# Patient Record
Sex: FEMALE | Race: WHITE | NOT HISPANIC OR LATINO | URBAN - METROPOLITAN AREA
[De-identification: names, ages, dates, MRNs, and addresses within clinical notes are randomized per-mention and may not be internally consistent; named-entity substitution may affect disease eponyms.]

---
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
## 2024-01-05 ENCOUNTER — TELEPHONE ENCOUNTER (OUTPATIENT)
Dept: URBAN - METROPOLITAN AREA CLINIC 70 | Facility: CLINIC | Age: 19
End: 2024-01-05

## 2024-01-17 ENCOUNTER — OFFICE VISIT (OUTPATIENT)
Dept: URBAN - METROPOLITAN AREA CLINIC 70 | Facility: CLINIC | Age: 19
End: 2024-01-17
Payer: COMMERCIAL

## 2024-01-17 VITALS
HEART RATE: 108 BPM | SYSTOLIC BLOOD PRESSURE: 135 MMHG | TEMPERATURE: 98.1 F | BODY MASS INDEX: 32.47 KG/M2 | WEIGHT: 219.2 LBS | DIASTOLIC BLOOD PRESSURE: 82 MMHG | HEIGHT: 69 IN

## 2024-01-17 DIAGNOSIS — K51.019 CHRONIC ULCERATIVE PANCOLITIS WITH COMPLICATION: ICD-10-CM

## 2024-01-17 PROCEDURE — 99214 OFFICE O/P EST MOD 30 MIN: CPT | Performed by: REGISTERED NURSE

## 2024-01-17 RX ORDER — USTEKINUMAB 90 MG/ML
90MG INJECTION, SOLUTION SUBCUTANEOUS
Status: ACTIVE | COMMUNITY
Start: 2023-07-18

## 2024-01-17 RX ORDER — CHOLESTYRAMINE 4 G/9G
1 SCOOP POWDER, FOR SUSPENSION ORAL
Qty: 240 GM | Refills: 5 | Status: DISCONTINUED | COMMUNITY
Start: 2023-10-18

## 2024-01-17 NOTE — HPI-TODAY'S VISIT:
She has now had Stelara induction dose and 2 injections. Her symptoms have not improved. She continues to have diarrhea, rectal bleeding and abdominal cramping. CRP and SED rate are elevated. Calprotectin has increased to 1880.

## 2024-01-17 NOTE — HPI-OTHER HISTORIES
Note from OV 5/19/23: Pt c/o diarrhea. Diarrhea has been present for 4 months. They are having 5+ BMs/day. Stool is loose to watery. Associated symptoms include lower abdominal pain, cramping, rectal bleeding and nausea. Workup includes labs on 5/15/23 that showed anemia and elevated LFTs. Hgb 9.6, MCV 77, Total Bilirubin 0.3, alk phos 102, ALT 80, . She denies heavy menstrual cycles. She took antibiotics in December for URI. ---------------------------------------------- Note from OV 7/18/23: Colonoscopy 6/27/23 showed pancolitis. Pathology consistent with ulcerative colitis. She was started on a prednisone taper the day of the procedure. She is feeling better. She is having fewer bowel movements and less bleeding. ---------------------------------------------- Note from OV 10/18/23: She received the Stelara infusion on 8/2/23. Insurance did not cover the injection but she has been approved for free medication through the drug company. She was due to get the injection 3 weeks ago but has not received it yet. She reports that she still has some abdominal cramping and diarrhea but the bleeding has resolved after the infusion. ------------------------------------------------

## 2024-01-24 ENCOUNTER — TELEPHONE ENCOUNTER (OUTPATIENT)
Dept: URBAN - METROPOLITAN AREA CLINIC 70 | Facility: CLINIC | Age: 19
End: 2024-01-24

## 2024-01-24 LAB
USTEKINUMAB AB, S: <10
USTEKINUMAB: 1.1

## 2024-01-24 RX ORDER — USTEKINUMAB 90 MG/ML
PRE-FILLED SYRINGE INJECTION, SOLUTION SUBCUTANEOUS
OUTPATIENT
Start: 2024-01-24

## 2024-01-24 RX ORDER — USTEKINUMAB 130 MG/26ML
AS DIRECTED SOLUTION INTRAVENOUS ONCE
OUTPATIENT
Start: 2024-01-24 | End: 2024-01-25

## 2024-02-14 ENCOUNTER — OV EP (OUTPATIENT)
Dept: URBAN - METROPOLITAN AREA CLINIC 70 | Facility: CLINIC | Age: 19
End: 2024-02-14

## 2024-02-14 VITALS
HEIGHT: 69 IN | WEIGHT: 220 LBS | TEMPERATURE: 97.9 F | HEART RATE: 90 BPM | DIASTOLIC BLOOD PRESSURE: 86 MMHG | SYSTOLIC BLOOD PRESSURE: 127 MMHG | BODY MASS INDEX: 32.58 KG/M2

## 2024-02-14 RX ORDER — USTEKINUMAB 90 MG/ML
90MG INJECTION, SOLUTION SUBCUTANEOUS
Status: ACTIVE | COMMUNITY
Start: 2023-07-18

## 2024-02-14 RX ORDER — USTEKINUMAB 90 MG/ML
PRE-FILLED SYRINGE INJECTION, SOLUTION SUBCUTANEOUS
Status: ACTIVE | COMMUNITY
Start: 2024-01-24

## 2024-03-01 ENCOUNTER — STI (OUTPATIENT)
Dept: URBAN - METROPOLITAN AREA CLINIC 117 | Facility: CLINIC | Age: 19
End: 2024-03-01
Payer: COMMERCIAL

## 2024-03-01 VITALS
TEMPERATURE: 98.7 F | RESPIRATION RATE: 20 BRPM | DIASTOLIC BLOOD PRESSURE: 76 MMHG | BODY MASS INDEX: 32.29 KG/M2 | HEIGHT: 69 IN | HEART RATE: 84 BPM | WEIGHT: 218 LBS | SYSTOLIC BLOOD PRESSURE: 129 MMHG

## 2024-03-01 DIAGNOSIS — K51.80 CHRONIC PANCOLONIC ULCERATIVE COLITIS: ICD-10-CM

## 2024-03-01 PROCEDURE — 96413 CHEMO IV INFUSION 1 HR: CPT | Performed by: INTERNAL MEDICINE

## 2024-03-01 RX ORDER — USTEKINUMAB 90 MG/ML
PRE-FILLED SYRINGE INJECTION, SOLUTION SUBCUTANEOUS
Qty: 1 | Refills: 11 | Status: ACTIVE | COMMUNITY
Start: 2024-01-24 | End: 2025-01-28

## 2024-03-01 RX ORDER — USTEKINUMAB 90 MG/ML
90MG INJECTION, SOLUTION SUBCUTANEOUS
Status: ACTIVE | COMMUNITY
Start: 2023-07-18

## 2024-05-23 ENCOUNTER — TELEPHONE ENCOUNTER (OUTPATIENT)
Dept: URBAN - METROPOLITAN AREA CLINIC 70 | Facility: CLINIC | Age: 19
End: 2024-05-23

## 2024-05-23 RX ORDER — VEDOLIZUMAB 300 MG/5ML
AS DIRECTED INJECTION, POWDER, LYOPHILIZED, FOR SOLUTION INTRAVENOUS
OUTPATIENT
Start: 2024-05-24

## 2024-06-21 ENCOUNTER — OFFICE VISIT (OUTPATIENT)
Dept: URBAN - METROPOLITAN AREA CLINIC 117 | Facility: CLINIC | Age: 19
End: 2024-06-21
Payer: COMMERCIAL

## 2024-06-21 VITALS
HEIGHT: 69 IN | SYSTOLIC BLOOD PRESSURE: 141 MMHG | DIASTOLIC BLOOD PRESSURE: 80 MMHG | TEMPERATURE: 97.6 F | BODY MASS INDEX: 31.99 KG/M2 | RESPIRATION RATE: 20 BRPM | WEIGHT: 216 LBS | HEART RATE: 76 BPM

## 2024-06-21 DIAGNOSIS — K51.80 CHRONIC PANCOLONIC ULCERATIVE COLITIS: ICD-10-CM

## 2024-06-21 PROCEDURE — 96413 CHEMO IV INFUSION 1 HR: CPT | Performed by: INTERNAL MEDICINE

## 2024-06-21 RX ORDER — USTEKINUMAB 90 MG/ML
PRE-FILLED SYRINGE INJECTION, SOLUTION SUBCUTANEOUS
Qty: 1 | Refills: 11 | Status: ACTIVE | COMMUNITY
Start: 2024-01-24 | End: 2025-01-28

## 2024-06-21 RX ORDER — VEDOLIZUMAB 300 MG/5ML
AS DIRECTED INJECTION, POWDER, LYOPHILIZED, FOR SOLUTION INTRAVENOUS
Status: ACTIVE | COMMUNITY
Start: 2024-05-24

## 2024-06-21 RX ORDER — USTEKINUMAB 90 MG/ML
90MG INJECTION, SOLUTION SUBCUTANEOUS
Status: ACTIVE | COMMUNITY
Start: 2023-07-18

## 2024-07-05 ENCOUNTER — OFFICE VISIT (OUTPATIENT)
Dept: URBAN - METROPOLITAN AREA CLINIC 117 | Facility: CLINIC | Age: 19
End: 2024-07-05
Payer: COMMERCIAL

## 2024-07-05 VITALS
BODY MASS INDEX: 32.29 KG/M2 | WEIGHT: 218 LBS | RESPIRATION RATE: 20 BRPM | DIASTOLIC BLOOD PRESSURE: 66 MMHG | HEIGHT: 69 IN | TEMPERATURE: 97.7 F | SYSTOLIC BLOOD PRESSURE: 148 MMHG | HEART RATE: 72 BPM

## 2024-07-05 DIAGNOSIS — K51.80 CHRONIC PANCOLONIC ULCERATIVE COLITIS: ICD-10-CM

## 2024-07-05 PROCEDURE — 96413 CHEMO IV INFUSION 1 HR: CPT | Performed by: STUDENT IN AN ORGANIZED HEALTH CARE EDUCATION/TRAINING PROGRAM

## 2024-07-05 RX ORDER — VEDOLIZUMAB 300 MG/5ML
AS DIRECTED INJECTION, POWDER, LYOPHILIZED, FOR SOLUTION INTRAVENOUS
Status: ACTIVE | COMMUNITY
Start: 2024-05-24

## 2024-07-05 RX ORDER — USTEKINUMAB 90 MG/ML
90MG INJECTION, SOLUTION SUBCUTANEOUS
Status: ACTIVE | COMMUNITY
Start: 2023-07-18

## 2024-07-05 RX ORDER — USTEKINUMAB 90 MG/ML
PRE-FILLED SYRINGE INJECTION, SOLUTION SUBCUTANEOUS
Qty: 1 | Refills: 11 | Status: ACTIVE | COMMUNITY
Start: 2024-01-24 | End: 2025-01-28

## 2024-07-11 ENCOUNTER — TELEPHONE ENCOUNTER (OUTPATIENT)
Dept: URBAN - METROPOLITAN AREA CLINIC 70 | Facility: CLINIC | Age: 19
End: 2024-07-11

## 2024-07-18 ENCOUNTER — TELEPHONE ENCOUNTER (OUTPATIENT)
Dept: URBAN - METROPOLITAN AREA CLINIC 70 | Facility: CLINIC | Age: 19
End: 2024-07-18

## 2024-08-02 ENCOUNTER — OFFICE VISIT (OUTPATIENT)
Dept: URBAN - METROPOLITAN AREA CLINIC 117 | Facility: CLINIC | Age: 19
End: 2024-08-02
Payer: COMMERCIAL

## 2024-08-02 VITALS
TEMPERATURE: 97.2 F | WEIGHT: 213.8 LBS | BODY MASS INDEX: 31.67 KG/M2 | RESPIRATION RATE: 20 BRPM | HEART RATE: 96 BPM | DIASTOLIC BLOOD PRESSURE: 86 MMHG | SYSTOLIC BLOOD PRESSURE: 152 MMHG | HEIGHT: 69 IN

## 2024-08-02 DIAGNOSIS — K51.80 CHRONIC PANCOLONIC ULCERATIVE COLITIS: ICD-10-CM

## 2024-08-02 PROCEDURE — 96413 CHEMO IV INFUSION 1 HR: CPT | Performed by: INTERNAL MEDICINE

## 2024-08-02 RX ORDER — USTEKINUMAB 90 MG/ML
PRE-FILLED SYRINGE INJECTION, SOLUTION SUBCUTANEOUS
Qty: 1 | Refills: 11 | Status: ACTIVE | COMMUNITY
Start: 2024-01-24 | End: 2025-01-28

## 2024-08-02 RX ORDER — USTEKINUMAB 90 MG/ML
90MG INJECTION, SOLUTION SUBCUTANEOUS
Status: ACTIVE | COMMUNITY
Start: 2023-07-18

## 2024-08-02 RX ORDER — VEDOLIZUMAB 300 MG/5ML
AS DIRECTED INJECTION, POWDER, LYOPHILIZED, FOR SOLUTION INTRAVENOUS
Status: ACTIVE | COMMUNITY
Start: 2024-05-24

## 2024-08-13 ENCOUNTER — DASHBOARD ENCOUNTERS (OUTPATIENT)
Age: 19
End: 2024-08-13

## 2024-08-13 ENCOUNTER — OFFICE VISIT (OUTPATIENT)
Dept: URBAN - METROPOLITAN AREA CLINIC 70 | Facility: CLINIC | Age: 19
End: 2024-08-13
Payer: COMMERCIAL

## 2024-08-13 VITALS
BODY MASS INDEX: 31.64 KG/M2 | HEIGHT: 69 IN | SYSTOLIC BLOOD PRESSURE: 142 MMHG | DIASTOLIC BLOOD PRESSURE: 86 MMHG | HEART RATE: 91 BPM | TEMPERATURE: 98.1 F | WEIGHT: 213.6 LBS

## 2024-08-13 DIAGNOSIS — K51.019 CHRONIC ULCERATIVE PANCOLITIS WITH COMPLICATION: ICD-10-CM

## 2024-08-13 PROCEDURE — 99214 OFFICE O/P EST MOD 30 MIN: CPT | Performed by: REGISTERED NURSE

## 2024-08-13 RX ORDER — BUDESONIDE 3 MG/1
3 CAPSULES CAPSULE, DELAYED RELEASE PELLETS ORAL ONCE A DAY
Qty: 168 CAPSULE | Refills: 0 | OUTPATIENT
Start: 2024-08-13

## 2024-08-13 RX ORDER — VEDOLIZUMAB 300 MG/5ML
AS DIRECTED INJECTION, POWDER, LYOPHILIZED, FOR SOLUTION INTRAVENOUS
Status: DISCONTINUED | COMMUNITY
Start: 2024-05-24

## 2024-08-13 RX ORDER — VEDOLIZUMAB 300 MG/5ML
AS DIRECTED INJECTION, POWDER, LYOPHILIZED, FOR SOLUTION INTRAVENOUS
OUTPATIENT

## 2024-08-13 RX ORDER — USTEKINUMAB 90 MG/ML
90MG INJECTION, SOLUTION SUBCUTANEOUS
Status: DISCONTINUED | COMMUNITY
Start: 2023-07-18

## 2024-08-13 RX ORDER — USTEKINUMAB 90 MG/ML
PRE-FILLED SYRINGE INJECTION, SOLUTION SUBCUTANEOUS
Qty: 1 | Refills: 11 | Status: DISCONTINUED | COMMUNITY
Start: 2024-01-24 | End: 2025-01-28

## 2024-08-13 RX ORDER — VEDOLIZUMAB 300 MG/5ML
AS DIRECTED INJECTION, POWDER, LYOPHILIZED, FOR SOLUTION INTRAVENOUS
Status: ACTIVE | COMMUNITY
Start: 2024-05-24

## 2024-08-13 NOTE — HPI-OTHER HISTORIES
Note from OV 5/19/23: Pt c/o diarrhea. Diarrhea has been present for 4 months. They are having 5+ BMs/day. Stool is loose to watery. Associated symptoms include lower abdominal pain, cramping, rectal bleeding and nausea. Workup includes labs on 5/15/23 that showed anemia and elevated LFTs. Hgb 9.6, MCV 77, Total Bilirubin 0.3, alk phos 102, ALT 80, . She denies heavy menstrual cycles. She took antibiotics in December for URI. ---------------------------------------------- Note from OV 7/18/23: Colonoscopy 6/27/23 showed pancolitis. Pathology consistent with ulcerative colitis. She was started on a prednisone taper the day of the procedure. She is feeling better. She is having fewer bowel movements and less bleeding. ---------------------------------------------- Note from OV 10/18/23: She received the Stelara infusion on 8/2/23. Insurance did not cover the injection but she has been approved for free medication through the drug company. She was due to get the injection 3 weeks ago but has not received it yet. She reports that she still has some abdominal cramping and diarrhea but the bleeding has resolved after the infusion. ------------------------------------------------ Note from OV 1/17/24: She has now had Stelara induction dose and 2 injections. Her symptoms have not improved. She continues to have diarrhea, rectal bleeding and abdominal cramping. CRP and SED rate are elevated. Calprotectin has increased to 1880. ---------------------------------------------

## 2024-08-13 NOTE — HPI-TODAY'S VISIT:
Maria R was DC's and she was started on Entyvio. Calprotectin was trending upward. She has received the 3 loading doses. So far she has had no symptomatic improvement. She has persistent diarrhea with cramping and urgency.

## 2024-09-27 ENCOUNTER — OFFICE VISIT (OUTPATIENT)
Dept: URBAN - METROPOLITAN AREA CLINIC 117 | Facility: CLINIC | Age: 19
End: 2024-09-27

## 2024-10-03 ENCOUNTER — OFFICE VISIT (OUTPATIENT)
Dept: URBAN - METROPOLITAN AREA CLINIC 117 | Facility: CLINIC | Age: 19
End: 2024-10-03
Payer: COMMERCIAL

## 2024-10-03 VITALS
BODY MASS INDEX: 30.81 KG/M2 | DIASTOLIC BLOOD PRESSURE: 81 MMHG | TEMPERATURE: 97.2 F | WEIGHT: 208 LBS | HEIGHT: 69 IN | SYSTOLIC BLOOD PRESSURE: 137 MMHG | HEART RATE: 99 BPM | RESPIRATION RATE: 20 BRPM

## 2024-10-03 DIAGNOSIS — K51.019 CHRONIC ULCERATIVE PANCOLITIS WITH COMPLICATION: ICD-10-CM

## 2024-10-03 PROCEDURE — 96413 CHEMO IV INFUSION 1 HR: CPT | Performed by: INTERNAL MEDICINE

## 2024-10-03 RX ORDER — BUDESONIDE 3 MG/1
3 CAPSULES CAPSULE, DELAYED RELEASE PELLETS ORAL ONCE A DAY
Qty: 168 CAPSULE | Refills: 0 | Status: ACTIVE | COMMUNITY
Start: 2024-08-13

## 2024-10-03 RX ORDER — VEDOLIZUMAB 300 MG/5ML
AS DIRECTED INJECTION, POWDER, LYOPHILIZED, FOR SOLUTION INTRAVENOUS
Status: ACTIVE | COMMUNITY

## 2024-11-12 ENCOUNTER — OFFICE VISIT (OUTPATIENT)
Dept: URBAN - METROPOLITAN AREA CLINIC 70 | Facility: CLINIC | Age: 19
End: 2024-11-12

## 2024-11-13 ENCOUNTER — TELEPHONE ENCOUNTER (OUTPATIENT)
Dept: URBAN - METROPOLITAN AREA CLINIC 23 | Facility: CLINIC | Age: 19
End: 2024-11-13

## 2024-11-27 ENCOUNTER — OFFICE VISIT (OUTPATIENT)
Dept: URBAN - METROPOLITAN AREA CLINIC 117 | Facility: CLINIC | Age: 19
End: 2024-11-27

## 2024-11-27 VITALS
DIASTOLIC BLOOD PRESSURE: 80 MMHG | HEART RATE: 82 BPM | TEMPERATURE: 98.1 F | RESPIRATION RATE: 20 BRPM | HEIGHT: 69 IN | SYSTOLIC BLOOD PRESSURE: 127 MMHG | BODY MASS INDEX: 28.73 KG/M2 | WEIGHT: 194 LBS

## 2024-11-27 RX ORDER — VEDOLIZUMAB 300 MG/5ML
AS DIRECTED INJECTION, POWDER, LYOPHILIZED, FOR SOLUTION INTRAVENOUS
Status: ACTIVE | COMMUNITY

## 2024-11-27 RX ORDER — BUDESONIDE 3 MG/1
3 CAPSULES CAPSULE, DELAYED RELEASE PELLETS ORAL ONCE A DAY
Qty: 168 CAPSULE | Refills: 0 | Status: ACTIVE | COMMUNITY
Start: 2024-08-13

## 2024-12-09 ENCOUNTER — TELEPHONE ENCOUNTER (OUTPATIENT)
Dept: URBAN - METROPOLITAN AREA CLINIC 70 | Facility: CLINIC | Age: 19
End: 2024-12-09

## 2024-12-16 ENCOUNTER — OFFICE VISIT (OUTPATIENT)
Dept: URBAN - METROPOLITAN AREA CLINIC 52 | Facility: CLINIC | Age: 19
End: 2024-12-16